# Patient Record
Sex: MALE | HISPANIC OR LATINO | Employment: OTHER | URBAN - METROPOLITAN AREA
[De-identification: names, ages, dates, MRNs, and addresses within clinical notes are randomized per-mention and may not be internally consistent; named-entity substitution may affect disease eponyms.]

---

## 2023-02-24 ENCOUNTER — OFFICE VISIT (OUTPATIENT)
Dept: OPHTHALMOLOGY | Facility: CLINIC | Age: 58
End: 2023-02-24
Attending: STUDENT IN AN ORGANIZED HEALTH CARE EDUCATION/TRAINING PROGRAM

## 2023-02-24 DIAGNOSIS — H25.813 COMBINED FORM OF AGE-RELATED CATARACT, BOTH EYES: Primary | ICD-10-CM

## 2023-02-24 DIAGNOSIS — H52.13 MYOPIA OF BOTH EYES WITH ASTIGMATISM AND PRESBYOPIA: ICD-10-CM

## 2023-02-24 DIAGNOSIS — E11.3593 PROLIFERATIVE DIABETIC RETINOPATHY OF BOTH EYES ASSOCIATED WITH TYPE 2 DIABETES MELLITUS, UNSPECIFIED PROLIFERATIVE RETINOPATHY TYPE (H): ICD-10-CM

## 2023-02-24 DIAGNOSIS — H52.4 MYOPIA OF BOTH EYES WITH ASTIGMATISM AND PRESBYOPIA: ICD-10-CM

## 2023-02-24 DIAGNOSIS — H43.11 VITREOUS HEMORRHAGE OF RIGHT EYE (H): ICD-10-CM

## 2023-02-24 DIAGNOSIS — H52.203 MYOPIA OF BOTH EYES WITH ASTIGMATISM AND PRESBYOPIA: ICD-10-CM

## 2023-02-24 PROCEDURE — 99207 FUNDUS PHOTOS OU (BOTH EYES): CPT | Mod: 26 | Performed by: STUDENT IN AN ORGANIZED HEALTH CARE EDUCATION/TRAINING PROGRAM

## 2023-02-24 PROCEDURE — 92250 FUNDUS PHOTOGRAPHY W/I&R: CPT | Performed by: STUDENT IN AN ORGANIZED HEALTH CARE EDUCATION/TRAINING PROGRAM

## 2023-02-24 PROCEDURE — 92004 COMPRE OPH EXAM NEW PT 1/>: CPT | Performed by: STUDENT IN AN ORGANIZED HEALTH CARE EDUCATION/TRAINING PROGRAM

## 2023-02-24 PROCEDURE — 92134 CPTRZ OPH DX IMG PST SGM RTA: CPT | Performed by: STUDENT IN AN ORGANIZED HEALTH CARE EDUCATION/TRAINING PROGRAM

## 2023-02-24 PROCEDURE — 76519 ECHO EXAM OF EYE: CPT | Performed by: STUDENT IN AN ORGANIZED HEALTH CARE EDUCATION/TRAINING PROGRAM

## 2023-02-24 PROCEDURE — G0463 HOSPITAL OUTPT CLINIC VISIT: HCPCS | Mod: 25

## 2023-02-24 RX ORDER — SIMVASTATIN 40 MG
40 TABLET ORAL EVERY EVENING
COMMUNITY
Start: 2022-12-31

## 2023-02-24 RX ORDER — LISINOPRIL 40 MG/1
40 TABLET ORAL DAILY
COMMUNITY
Start: 2022-12-31

## 2023-02-24 RX ORDER — HUMAN INSULIN 100 [IU]/ML
INJECTION, SUSPENSION SUBCUTANEOUS DAILY
COMMUNITY
Start: 2022-03-31

## 2023-02-24 RX ORDER — GEMFIBROZIL 600 MG/1
600 TABLET, FILM COATED ORAL DAILY
COMMUNITY

## 2023-02-24 ASSESSMENT — REFRACTION_MANIFEST
OD_ADD: +2.50
OS_CYLINDER: +1.50
OS_AXIS: 175
OS_ADD: +2.50
OD_AXIS: 179
OD_SPHERE: -1.50
OD_CYLINDER: +1.00
OS_SPHERE: -1.50

## 2023-02-24 ASSESSMENT — CUP TO DISC RATIO
OS_RATIO: 0.3
OD_RATIO: 0.3

## 2023-02-24 ASSESSMENT — REFRACTION_WEARINGRX
OD_CYLINDER: +1.75
OS_AXIS: 180
OS_CYLINDER: +1.50
OD_ADD: +2.50
OS_SPHERE: -1.75
OD_SPHERE: -1.25
SPECS_TYPE: PAL
OD_AXIS: 168
OS_ADD: +2.50

## 2023-02-24 ASSESSMENT — EXTERNAL EXAM - RIGHT EYE: OD_EXAM: WNL

## 2023-02-24 ASSESSMENT — CONF VISUAL FIELD
OD_SUPERIOR_TEMPORAL_RESTRICTION: 0
OD_INFERIOR_TEMPORAL_RESTRICTION: 0
METHOD: COUNTING FINGERS
OD_SUPERIOR_NASAL_RESTRICTION: 0
OS_INFERIOR_TEMPORAL_RESTRICTION: 0
OS_NORMAL: 1
OD_INFERIOR_NASAL_RESTRICTION: 0
OS_SUPERIOR_NASAL_RESTRICTION: 0
OD_NORMAL: 1
OS_SUPERIOR_TEMPORAL_RESTRICTION: 0
OS_INFERIOR_NASAL_RESTRICTION: 0

## 2023-02-24 ASSESSMENT — VISUAL ACUITY
OS_CC: 20/80
METHOD: SNELLEN - LINEAR
OS_PH_CC: 20/50
OS_PH_CC+: -3
OD_CC: 20/50
METHOD_MR: DIAGNOSTIC REFRACTION
OS_CC+: -1

## 2023-02-24 ASSESSMENT — TONOMETRY
IOP_METHOD: TONOPEN
OS_IOP_MMHG: 21
OD_IOP_MMHG: 21

## 2023-02-24 ASSESSMENT — SLIT LAMP EXAM - LIDS
COMMENTS: WNL
COMMENTS: WNL

## 2023-02-24 ASSESSMENT — EXTERNAL EXAM - LEFT EYE: OS_EXAM: WNL

## 2023-02-24 NOTE — NURSING NOTE
Chief Complaints and History of Present Illnesses   Patient presents with     Cataract Evaluation     Chief Complaint(s) and History of Present Illness(es)     Cataract Evaluation            Laterality: both eyes    Associated symptoms: blurred vision    Pain scale: 0/10          Comments    Ramo is here new to clinic for evaluation of cataracts BE. He states RE>LE. History of type 2 diabetes, and has had retinopathy BE; with laser procedure. He says he noticed shadows in RE vision suddenly this past Tuesday. He went to his primary and was told he has cataracts.      Last A1C was 8.5  BS today unknown    Alton Lemos COT 9:46 AM February 24, 2023

## 2023-02-24 NOTE — PROGRESS NOTES
HPI     Cataract Evaluation    In both eyes.  Associated symptoms include blurred vision.  Pain was noted as 0/10.           Comments    Ramo is here new to clinic for evaluation of cataracts BE. He states RE>LE. History of type 2 diabetes, and has had retinopathy BE; with laser procedure. He says he noticed shadows in RE vision suddenly this past Tuesday. He went to his primary and was told he has cataracts.      Last A1C was 8.5  BS today unknown    Alton Lemos COT 9:46 AM February 24, 2023                 Last edited by Alton Lemos on 2/24/2023  9:46 AM.         HPI     Cataract Evaluation    In both eyes.  Associated symptoms include blurred vision.  Pain was noted as 0/10.           Comments    Ramo is here new to clinic for evaluation of cataracts BE. He states RE>LE. History of type 2 diabetes, and has had retinopathy BE; with laser procedure. He says he noticed shadows in RE vision suddenly this past Tuesday. He went to his primary and was told he has cataracts.      Last A1C was 8.5  BS today unknown    Alton Lemos COT 9:46 AM February 24, 2023                 Last edited by Alton Lemos on 2/24/2023  9:46 AM.            Review of systems for the eyes was negative other than the pertinent positives/negatives listed in the HPI.    Ocular Meds: none    Ocular Hx: refractive error OU PDR OU s/p PRP OU  At [facility]; no history of injections     FOHx: no family history of glaucoma or blindness    PMHx:  Diabetes (T2DM diagnosed at 32 years old); HLD    SocHx: works as a [employer]    Assessment & Plan      Ramo Granda is a 57 year old male with the following diagnoses:    1. Combined form of age-related cataract, both eyes    2. Proliferative diabetic retinopathy of both eyes associated with type 2 diabetes mellitus, unspecified proliferative retinopathy type (H)    3. Vitreous hemorrhage of right eye (H)    4. Myopia of both eyes with astigmatism and presbyopia         Combined form of  age-related cataract OU  - visually significant and affecting ADLs; right eye may also have vision impacted by vitreous hemorrhage, but also clinically significant and bothering patient prior to recent symptoms of VH as noted on exam  - patient is right eye dominant  - various lens options discussed with patient including premium, toric, and monofocal lenses; patient would like monofocal lenses aimed at plano and understands the possible need for glasses for distance and near vision  - r/b/a of cataract extraction with intraocular lens insertion including blindness, decreased vision, damage to surrounding structures, bleeding, infection, risk of anesthesia, and need for additional surgeries d/w pt, pt expressed understanding and would like to proceed; and informed consent was obtained  - patient understands that underlying retinal findings may ultimately impact BCVA, also noted to have vitreous hemorrhage right eye today, will have patient see retina  - preop/postop drops prescribed: vigamox/predforte/ketorolac QID starting day of surgery; will Rx once patient determines if he is able to undergo surgery due to not having health insurance at this time  - patient will need to see PCP for surgical clearance  - patient to schedule POD#1, POW#1, POM#1 appt; will scheduled left eye first then right eye  - Of note, patient notes being uninsured and may need to speak with social work to obtain insurance or speak with financial department prior to performing surgery    Special equipment/needs:  Eye: left  Anesthesia:Topical  Dilates to: 6.5  Iris expansion:  maybe  Trypan Blue: maybe    Able lay to flat: Yes  Blood Thinner: Yes 81 mg daily  Tamsulosin: No  DM: Yes  Fuch's/Guttae/PXF: No   LASIK/PRK/SMILE/Eye Surgery.Intravitreal injection: Yes PRP OU (approx 2019)  Trauma: No     T2DM with PDR OU  - diagnosed at 32 years old  - s/p PRP OU (approx 2019)  - denies history of intravitreal injection     Vitreous Hemorrhage OD  -  new floaters since Tuesday 2/21/23  - , no holes, tears, or detachment  - RD precautions reviewed  - refer to retina    Myopia of both eyes with astigmatism and presbyopia  - hold on new MRx at this time until after cataract surgery    Counseled return/RD precautions    Patient disposition:   Return for follow up with retina next available; follow up for post op cataract surgery visit, sooner changes.    Attending Physician Attestation:  Complete documentation of historical and exam elements from today's encounter can be found in the full encounter summary report (not reduplicated in this progress note).  I personally obtained the chief complaint(s) and history of present illness.  I confirmed and edited as necessary the review of systems, past medical/surgical history, family history, social history, and examination findings as documented by others; and I examined the patient myself.  I personally reviewed the relevant tests, images, and reports as documented above.  I formulated and edited as necessary the assessment and plan and discussed the findings and management plan with the patient and family. - Tenisha Roman MD

## 2023-02-28 ENCOUNTER — HOSPITAL ENCOUNTER (OUTPATIENT)
Facility: AMBULATORY SURGERY CENTER | Age: 58
End: 2023-02-28
Attending: STUDENT IN AN ORGANIZED HEALTH CARE EDUCATION/TRAINING PROGRAM

## 2023-02-28 DIAGNOSIS — E11.3593 PROLIFERATIVE DIABETIC RETINOPATHY OF BOTH EYES ASSOCIATED WITH TYPE 2 DIABETES MELLITUS, UNSPECIFIED PROLIFERATIVE RETINOPATHY TYPE (H): Primary | ICD-10-CM

## 2023-02-28 DIAGNOSIS — H25.813 COMBINED FORM OF AGE-RELATED CATARACT, BOTH EYES: ICD-10-CM

## 2023-03-01 ENCOUNTER — TELEPHONE (OUTPATIENT)
Dept: OPHTHALMOLOGY | Facility: CLINIC | Age: 58
End: 2023-03-01

## 2023-03-03 ENCOUNTER — TELEPHONE (OUTPATIENT)
Dept: OPHTHALMOLOGY | Facility: CLINIC | Age: 58
End: 2023-03-03

## 2023-03-03 NOTE — TELEPHONE ENCOUNTER
Call goes straight to CRISTÓBAL MCCLAIN to schedule with Dr.Rashidi Anna C. Schoenecker on 3/3/2023 at 11:01 AM

## 2023-03-13 ENCOUNTER — TELEPHONE (OUTPATIENT)
Dept: OPHTHALMOLOGY | Facility: CLINIC | Age: 58
End: 2023-03-13

## 2023-03-14 ENCOUNTER — OFFICE VISIT (OUTPATIENT)
Dept: OPHTHALMOLOGY | Facility: CLINIC | Age: 58
End: 2023-03-14
Attending: OPHTHALMOLOGY

## 2023-03-14 DIAGNOSIS — E11.3513 PROLIFERATIVE DIABETIC RETINOPATHY OF BOTH EYES WITH MACULAR EDEMA ASSOCIATED WITH TYPE 2 DIABETES MELLITUS (H): Primary | ICD-10-CM

## 2023-03-14 DIAGNOSIS — H25.813 COMBINED FORMS OF AGE-RELATED CATARACT OF BOTH EYES: ICD-10-CM

## 2023-03-14 DIAGNOSIS — H43.11 VITREOUS HEMORRHAGE OF RIGHT EYE (H): ICD-10-CM

## 2023-03-14 DIAGNOSIS — Z59.71 DOES NOT HAVE HEALTH INSURANCE: ICD-10-CM

## 2023-03-14 PROCEDURE — 67228 TREATMENT X10SV RETINOPATHY: CPT | Mod: RT | Performed by: OPHTHALMOLOGY

## 2023-03-14 PROCEDURE — 99214 OFFICE O/P EST MOD 30 MIN: CPT | Mod: 25 | Performed by: OPHTHALMOLOGY

## 2023-03-14 PROCEDURE — G0463 HOSPITAL OUTPT CLINIC VISIT: HCPCS | Mod: 25

## 2023-03-14 PROCEDURE — 67228 TREATMENT X10SV RETINOPATHY: CPT | Mod: LT | Performed by: OPHTHALMOLOGY

## 2023-03-14 PROCEDURE — G0463 HOSPITAL OUTPT CLINIC VISIT: HCPCS | Mod: 25 | Performed by: OPHTHALMOLOGY

## 2023-03-14 ASSESSMENT — VISUAL ACUITY
OD_PH_SC+: -1
OS_SC: 20/125
OS_PH_SC: 20/30
OD_PH_SC: 20/40
METHOD: SNELLEN - LINEAR
OD_SC+: -2
OD_SC: 20/50

## 2023-03-14 ASSESSMENT — CONF VISUAL FIELD
OS_INFERIOR_NASAL_RESTRICTION: 0
OS_SUPERIOR_NASAL_RESTRICTION: 0
OD_INFERIOR_TEMPORAL_RESTRICTION: 0
OD_SUPERIOR_NASAL_RESTRICTION: 0
OS_INFERIOR_TEMPORAL_RESTRICTION: 0
METHOD: COUNTING FINGERS
OD_NORMAL: 1
OD_INFERIOR_NASAL_RESTRICTION: 0
OS_NORMAL: 1
OS_SUPERIOR_TEMPORAL_RESTRICTION: 0
OD_SUPERIOR_TEMPORAL_RESTRICTION: 0

## 2023-03-14 ASSESSMENT — TONOMETRY
OS_IOP_MMHG: 18
IOP_METHOD: TONOPEN
OD_IOP_MMHG: 18

## 2023-03-14 ASSESSMENT — CUP TO DISC RATIO
OS_RATIO: 0.3
OD_RATIO: 0.3

## 2023-03-14 ASSESSMENT — EXTERNAL EXAM - LEFT EYE: OS_EXAM: WNL

## 2023-03-14 ASSESSMENT — SLIT LAMP EXAM - LIDS
COMMENTS: WNL
COMMENTS: WNL

## 2023-03-14 ASSESSMENT — EXTERNAL EXAM - RIGHT EYE: OD_EXAM: WNL

## 2023-03-14 NOTE — PROGRESS NOTES
CC -   PDR    INTERVAL HISTORY - Initial visit with me     here for evaluation of new black shadows in the right eye. He was referred by Dr. Roman in our department.     Mr. Granda noticed these new shadows ~2/21/23. He has a history of previous treatment with laser for proliferative diabetic retinopathy in 2019. He has never had any intravitreal injections.    shadows described as strings/spider webs    DM2  HTN  HLD    [facility]  -   Ramo DACIA Granda is a 57 year old male with PDR OU Tx by [facility] @ [facility] in past  New VH OD 3/2023 seen by Abel referred to Retina    DM II since 1988, good BG control    He was diagnosed with diabetes at age 33. He does use insulin. He does have a PCP -- Dr. Gale at [facility]. His most recent A1c was ~8.5 per his report.     He is  and lives with his wife and their adult daughter.  works as     PAST OCULAR SURGERY:  PRP OU 2019 ([facility])      RETINAL IMAGING:    OCT Macula 2/24/23  OD: foveal contour present with patchy shadowing of EZ layer at fovea, ST cystic fluid without center involvement, choroid mildly thin, hyaloid attached with reflective opacities  OS: foveal contour present, IRF temporal to fovea and subtle amounts nasal to fovea without center involvement, choroid mildly thin, hyaloid attached      ASSESSMENT & PLAN    # PDR with DM II and DME OU   - h/o PRP at outside facility ~2019 both eyes   - both eyes with active proliferative disease today   - right eye with NVD and VH      - d/w patient PRP fill-in vs Avastin   - r/b/a PRP d/w patient: night vision loss, peripheral vision loss   - r/b/a Avastin d/w patient; infection, blindness   - patient elects PRP     - PRP fill-in today   - recheck 1 month      # DME OU   - mild        # Cataract, OU   - follows with Dr. Roman who has recommended extraction   - diagnositc MRx        return to clinic: 1 month for V/T/D, diagnostic MRx    Alan Hendrix MD  Retina Fellow, PGY6      ATTESTATION     Attending Physician  Attestation:      Complete documentation of historical and exam elements from today's encounter can be found in the full encounter summary report (not reduplicated in this progress note).  I personally obtained the chief complaint(s) and history of present illness.  I confirmed and edited as necessary the review of systems, past medical/surgical history, family history, social history, and examination findings as documented by others; and I examined the patient myself.  I personally reviewed the relevant tests, images, and reports as documented above.  I personally reviewed the ophthalmic test(s) associated with this encounter, agree with the interpretation(s) as documented by the resident/fellow, and have edited the corresponding report(s) as necessary.   I formulated and edited as necessary the assessment and plan and discussed the findings and management plan with the patient and family and I was present for critical portions of the procedure carried out by the resident/fellow and immediately available for the entire procedure    Marcie Jiménez MD, PhD  , Vitreoretinal Surgery  Department of Ophthalmology  [facility]

## 2023-03-14 NOTE — NURSING NOTE
Chief Complaints and History of Present Illnesses   Patient presents with     Retinal Evaluation     Chief Complaint(s) and History of Present Illness(es)     Retinal Evaluation            Laterality: both eyes    Course: stable    Associated symptoms: Negative for eye pain, flashes and floaters    Treatments tried: artificial tears          Comments    Pt with hx of PDR both eyes and vit heme right eye here for retinal evaluation. Vision is about the same. Still noticing shadow superotemporal in right eye. No flashes or floaters. Uses Ats PRN. No eye pain.    Last a1c: 8.5      Suman Gaby COT 8:03 AM March 14, 2023

## 2023-03-15 ENCOUNTER — TELEPHONE (OUTPATIENT)
Dept: OPHTHALMOLOGY | Facility: CLINIC | Age: 58
End: 2023-03-15

## 2023-03-21 ENCOUNTER — TELEPHONE (OUTPATIENT)
Dept: OPHTHALMOLOGY | Facility: CLINIC | Age: 58
End: 2023-03-21

## 2023-03-21 NOTE — TELEPHONE ENCOUNTER
Patient reached out to discuss scheduling and was wondering if there is a time frame in which this should be completed/urgency. He is also wondering a ball park of cost of surgery.    Anna C. Schoenecker on 3/21/2023 at 2:37 PM

## 2023-03-27 ENCOUNTER — TELEPHONE (OUTPATIENT)
Dept: OPHTHALMOLOGY | Facility: CLINIC | Age: 58
End: 2023-03-27

## 2023-03-30 ENCOUNTER — TELEPHONE (OUTPATIENT)
Dept: OPHTHALMOLOGY | Facility: CLINIC | Age: 58
End: 2023-03-30

## 2023-03-30 NOTE — TELEPHONE ENCOUNTER
Called patient to schedule surgery with Dr. Roman    Date of Surgery: 4/27,5/16    Location of surgery: [facility]    Pre-Op H&P: PAC    Post-Op Appt Date: 4/28,5/5,5/17,5/24,6/16    Surgery Packet Mailed: yes    Additional comments: Spoke with patient, he is aware of above dates, will review packet and reach out with any questions             Anna C. Schoenecker on 3/30/2023 at 9:17 AM

## 2023-03-31 NOTE — TELEPHONE ENCOUNTER
FUTURE VISIT INFORMATION      SURGERY INFORMATION:    Date: 4/27/23    Location:  or    Surgeon:  Tenisha Roman MD    Anesthesia Type:  MAC with Topical    Procedure: PHACOEMULSIFICATION, CATARACT, WITH INTRAOCULAR LENS IMPLANT LEFT EYE    RECORDS REQUESTED FROM:         Pertinent Medical History: None

## 2023-04-06 ENCOUNTER — TELEPHONE (OUTPATIENT)
Dept: OPHTHALMOLOGY | Facility: CLINIC | Age: 58
End: 2023-04-06

## 2023-04-06 NOTE — TELEPHONE ENCOUNTER
Spoke with patient, they are unable to have surgery at this time and wish us to reach out in June so he is able to collect funds for it. Will remove from current schedule and call patient to reschedule in 2 months     Anna C. Schoenecker on 4/6/2023 at 11:22 AM

## 2023-04-24 ENCOUNTER — PRE VISIT (OUTPATIENT)
Dept: SURGERY | Facility: CLINIC | Age: 58
End: 2023-04-24

## 2023-05-26 DIAGNOSIS — E11.3513 PROLIFERATIVE DIABETIC RETINOPATHY OF BOTH EYES WITH MACULAR EDEMA ASSOCIATED WITH TYPE 2 DIABETES MELLITUS (H): Primary | ICD-10-CM

## 2023-06-06 ENCOUNTER — TELEPHONE (OUTPATIENT)
Dept: OPHTHALMOLOGY | Facility: CLINIC | Age: 58
End: 2023-06-06

## 2023-06-06 NOTE — TELEPHONE ENCOUNTER
Called patient to schedule surgery with Dr. Roman    Date of Surgery: 1/2,1/16    Location of surgery: [facility]    Pre-Op H&P: PCP    Post-Op Appt Date: 1/3,1/10,1/17,1/24,1/31,2/21    Surgery Packet Mailed: yes    Additional comments: Spoke with patient to schedule, he was offered sooner dates but was unable to get permission to get time off work, he is aware of above date will review packet and reach out with any questions     Anna C. Schoenecker on 6/6/2023 at 12:30 PM          Anna C. Schoenecker on 6/6/2023 at 12:29 PM

## 2023-09-20 NOTE — TELEPHONE ENCOUNTER
FUTURE VISIT INFORMATION      SURGERY INFORMATION:  Date: 1/2/24  Location:  or  Surgeon:  Tenisha Roman MD   Anesthesia Type:  MAC with Topical   Procedure: PHACOEMULSIFICATION, CATARACT, WITH INTRAOCULAR LENS IMPLANT LEFT EYE     RECORDS REQUESTED FROM:         Pertinent Medical History: Non

## 2023-11-29 ENCOUNTER — TELEPHONE (OUTPATIENT)
Dept: OPHTHALMOLOGY | Facility: CLINIC | Age: 58
End: 2023-11-29

## 2023-11-29 NOTE — TELEPHONE ENCOUNTER
Patient called to CX surgery for now. Will reach out when ready to schedule       Anna C. Schoenecker on 11/29/2023 at 1:03 PM

## 2023-12-29 ENCOUNTER — PRE VISIT (OUTPATIENT)
Dept: SURGERY | Facility: CLINIC | Age: 58
End: 2023-12-29